# Patient Record
Sex: FEMALE | Race: WHITE | Employment: OTHER | URBAN - METROPOLITAN AREA
[De-identification: names, ages, dates, MRNs, and addresses within clinical notes are randomized per-mention and may not be internally consistent; named-entity substitution may affect disease eponyms.]

---

## 2021-06-25 ENCOUNTER — APPOINTMENT (OUTPATIENT)
Dept: RADIOLOGY | Facility: MEDICAL CENTER | Age: 81
End: 2021-06-25
Payer: COMMERCIAL

## 2021-06-25 ENCOUNTER — OFFICE VISIT (OUTPATIENT)
Dept: URGENT CARE | Facility: MEDICAL CENTER | Age: 81
End: 2021-06-25
Payer: COMMERCIAL

## 2021-06-25 VITALS
DIASTOLIC BLOOD PRESSURE: 75 MMHG | SYSTOLIC BLOOD PRESSURE: 197 MMHG | TEMPERATURE: 96.6 F | HEART RATE: 68 BPM | RESPIRATION RATE: 16 BRPM | OXYGEN SATURATION: 99 %

## 2021-06-25 DIAGNOSIS — S46.011A STRAIN OF RIGHT ROTATOR CUFF CAPSULE, INITIAL ENCOUNTER: Primary | ICD-10-CM

## 2021-06-25 DIAGNOSIS — S46.011A STRAIN OF RIGHT ROTATOR CUFF CAPSULE, INITIAL ENCOUNTER: ICD-10-CM

## 2021-06-25 PROCEDURE — S9083 URGENT CARE CENTER GLOBAL: HCPCS | Performed by: PHYSICIAN ASSISTANT

## 2021-06-25 PROCEDURE — 73030 X-RAY EXAM OF SHOULDER: CPT

## 2021-06-25 PROCEDURE — 99213 OFFICE O/P EST LOW 20 MIN: CPT | Performed by: PHYSICIAN ASSISTANT

## 2021-06-25 RX ORDER — LORAZEPAM 2 MG/1
4 TABLET ORAL
COMMUNITY
Start: 2021-06-07

## 2021-06-25 NOTE — PATIENT INSTRUCTIONS
Motrin and/or Tylenol as needed for pain relief   follow-up with physical therapy  Follow up with PCP in 3-5 days  Proceed to  ER if symptoms worsen  Rotator Cuff Injury   AMBULATORY CARE:   A rotator cuff injury  is damage to the muscles or tendons of your rotator cuff  The rotator cuff is a group of muscles and tendons that hold the shoulder joint in place  The damage may include muscle stretching, tendon tears, or bursa inflammation  The bursa is a fluid sac around the joint  Common signs and symptoms:   · Pain that may be constant or come and go (such as only when you lie on the injured shoulder)    · Pain or stiffness in your shoulder that travels down your arm    · Trouble lifting your arm or placing it behind your back    · Trouble moving or using your shoulder    · A swollen shoulder that may be painful to the touch    · Numbness in part or all of your arm    · A popping noise along with pain when you lift your arm    Call your doctor or orthopedist if:   · You suddenly cannot move your arm  · The pain in your shoulder or arm is not improving, or is worse than before you started treatment  · You have new pain in your neck  · You have questions or concerns about your condition or care  Treatment  may include any of the following:  · Medicines  may be needed for pain or inflammation  · A physical therapist  can teach you exercises to help improve shoulder movement and strength, and decrease pain  You may learn changes to daily activities that will help decrease stress on your tendons  · Surgery  may be needed if your injury is severe or your symptoms do not improve  Surgery may be used to clean away damaged tissue or fix a tear  A piece of another tissue or muscle may be used to fix a badly torn tendon  The bone of your shoulder joint may be reshaped so it stays in place  An artificial joint made of metal and plastic may be put into your shoulder      Care for your rotator cuff injury at home:   · Rest  may help your shoulder heal  Overuse of your shoulder can make your injury worse  Avoid heavy lifting, putting your arms over your head, or sports that need an overhead or throwing motion  Any of these movements can cause or worsen a rotator cuff injury  · Put ice on your shoulder  every few hours for the first several days  Ice helps decrease pain and swelling  Use an ice pack, or put crushed ice in a plastic bag  Wrap a towel around the bag before you put it on your shoulder  Apply ice for 15 minutes every hour, or as directed  · Put heat on your shoulder  when directed  After the first several days, heat may help relax the muscles in your shoulder  Use a heat pack or heating pad  Apply heat for 20 minutes every hour, or as directed  Follow up with your doctor or orthopedist as directed:  Write down your questions so you remember to ask them during your visits  © Copyright [address] Information is for End User's use only and may not be sold, redistributed or otherwise used for commercial purposes  All illustrations and images included in CareNotes® are the copyrighted property of [employer]  or [address] [place] [address]  The above information is an  only  It is not intended as medical advice for individual conditions or treatments  Talk to your doctor, nurse or pharmacist before following any medical regimen to see if it is safe and effective for you  Rotator Cuff Injury Exercises   AMBULATORY CARE:   What you need to know about rotator cuff injury exercises:  Exercises help improve shoulder movement and strength, and decrease pain  Your physical therapist or healthcare provider will tell you when to start doing the exercises  He or she will tell you how often to do them  You will need to start slowly to prevent more injury  You will move through several levels over time as you get stronger and more flexible       Safety guidelines:   · Always warm up before you do the exercises  Walk or ride a stationary bike for 5 to 10 minutes to help you warm up  · Do not put your arm over your head until directed  You will need to wait until your injury has healed  The movement of some exercises could continue until your arm is over your head  You will need to stop the movement where directed  · Stop if you feel pain  You may feel some tight or stiff areas when you start  This should get better as you continue the exercises  You should not feel pain  Pain means you are not ready to do the exercise yet  Stop and call your physical therapist or healthcare provider right away  · Always work both rotator cuffs  Even if your injury is only on 1 side, it is important to do each exercise on both sides  This helps prevent injury and maintain balance in your shoulders and back  · Posture is important  Your physical therapist or healthcare provider will show you the proper posture for each exercise  You will be shown how to pull your shoulders back and down to engage the correct muscles  Remember not to let your shoulders shrug during an exercise unless it is part of the movement  How to do stretching exercises: You will not feel every exercise in your shoulder area  You may feel some of the stretches in your back, side, or upper arm muscles  You need to work muscles in and around your rotator cuffs and down your arms  This helps stabilize your shoulders  Your physical therapist or healthcare provider will tell you how long to hold each stretch  He or she will also tell you how many times to repeat each stretch during an exercise session  You may be told to do only certain exercises, or to do them in a specific order  The following are general directions to help you remember the exercises you are taught:  · Pendulum swings:  Lean forward and rest your arm on a table  Do not round your back or lock your knees during the exercise   Let your other arm hang freely by your side  Gently swing your free arm forward and backward, side to side, and in circles  · Crossover arm stretch:  Relax your shoulders  Hold your upper arm with the opposite hand  Pull your arm across your chest until you feel a stretch  Hold the stretch  Return to the starting position  · Sleeper stretch:  Lie on your side on a firm, flat surface  Bend the elbow of your top arm 90°  Use your other hand to slowly push your arm down  Stop when you feel a stretch at the back of your shoulder  Hold the stretch  Slowly return to the starting position  · Shoulder movement, up and down: This exercise may also be called shoulder extension  Sit in a chair that has a back but no armrests  Raise your arm like you are reaching for the wall in front of you  Continue to raise the arm until it is over your head, or as high as directed  Bring your arm back down to your side  Bring it back as far as possible behind your body  Return your arm to the starting position  · Shoulder movement, side to side: These movements may be called flexion, internal rotation, and external rotation  Sit in a chair that has a back but no armrests  Raise your arm to the side and then up over your head as far as directed  Return your arm to your side  Bring your arm across the front of your body and reach for the opposite shoulder  Return your arm to the starting position  · Shoulder rolls:  Stand and raise both shoulders toward your ears  Lower them to the starting position  Relax your shoulders  Pull your shoulders back  Then relax them again  Roll your shoulders in a smooth [place]  Then roll your shoulders in a smooth [place] in the other direction  · Wall reach exercise: This may also be called a flexion stretch  Stand facing a wall  Slowly walk your fingers up the wall until you feel a stretch  Hold the stretch  Return to the starting position           · Arm reach exercise:  Lie on your back with your legs straight  Reach your arms like you are trying to touch the ceiling  Reach as high as you can so you feel a stretch in the back of your arms  Hold the stretch  Then lower your arms to your sides  · Elbow bends:  Stand with your arms down to your sides  Keep your palm facing forward  Bend your elbow and try to touch your shoulder with your fingertips  Return your arm to the starting position  · Up the back stretch:  Stand and put both arms behind your back  Put one hand under the other  Move the bottom hand and slowly push the upper hand up toward your head  You should feel a stretch in the front of your arm and shoulder  Be careful not to push too hard  Hold the stretch  Then return to the starting position  · Triceps stretch:  Stand and drop your forearm down your back so your hand is pointing to the ground behind you  Your elbow should be pointing at the ceiling  Take your other hand and place it on your elbow  Gently and slowly push on the elbow until you feel a stretch in the back of your arm  Hold the stretch  Let go of your elbow and relax your arm  You may be shown how to do this stretch with a towel  The towel can be pulled gently with a hand behind your back at waist level  How to do strengthening exercises:  Strengthening exercises may include handheld weights or resistance bands  Your physical therapist or healthcare provider will tell you how much weight or resistance to use  The general guide is to use light weights or low resistance and to do a high number of repetitions  You may be told to do only certain exercises, or to do them in a specific order  The following are general directions to help you remember the exercises you are taught:  · Scapular squeeze:  Stand with your arms at your sides  Squeeze your shoulder blades together and hold this position  Then relax the muscles  Keep your shoulder back during the entire exercise  · Wall pushups:   This exercise is similar to a pushup done on the ground  The goal is to use your back and shoulder muscles to bring your upper body toward and away from the wall  Stand facing a wall  Put your hands on the wall  Bend your elbows to bring your upper body toward the wall  Straighten your arms to return to the starting position  Keep your feet close enough to the wall that you do not take a step when you bend your elbows  · Standing row with exercise band:  Wrap the exercise band around a heavy, stable object at waist level  Make loop in the end of the band to create a handle, if needed  Hold the handle or loop and pull the band straight back toward your hip  Keep your shoulder down  Squeeze your shoulder blade  Hold this position  Then slowly return to the starting position  · External rotation with arm abducted 90 degrees:  Wrap the exercise band around a heavy, stable object at waist level  Make loop in the end of the band to create a handle, if needed  Stand and hold the handle or loop  Bend your elbow and raise your arm to shoulder height  Keep your arm in this position  Raise your hand like you are pointing at the ceiling  Slowly return to the starting position  You may also be shown how to do this exercise lying down and with a weight  · Shoulder abduction with weight:  Stand and hold a weight in your hand with your palm facing your body  Slowly raise your arm to the side with your thumb pointing up  Then raise your arm as far as you can without pain  Hold this position  Then return to the starting position  · Shoulder abduction with exercise band:  Wrap the exercise band around a heavy, stable object near your foot  Grab the band  Keep your arm straight  Slowly raise your arm to the side with your thumb pointing up  Then, slowly pull the band as far as you can without pain  Slowly return to the starting position           · Shoulder adduction with weight:  Lie on your back on a firm surface  Hold a weight in your hand at your shoulder  Slowly raise your arm toward the ceiling and straighten your elbow  Hold this position  Then slowly return to the starting position  · Shoulder adduction with exercise band:  Wrap the exercise band around a heavy, stable object  Stand and face away from where the band is anchored  Hold each end of the band in both hands with your elbows bent  Your elbows should not be behind your body  Keep your arms parallel to the floor and slowly straighten your elbows  Hold this position  Slowly return to the starting position  Call your doctor or physical therapist if:   · You have sharp or worsening pain during exercise or at rest     · You have questions or concerns about your rotator cuff injury exercises  © Copyright [address] Information is for End User's use only and may not be sold, redistributed or otherwise used for commercial purposes  All illustrations and images included in CareNotes® are the copyrighted property of [employer]  or [facility] Irais Lin   The above information is an  only  It is not intended as medical advice for individual conditions or treatments  Talk to your doctor, nurse or pharmacist before following any medical regimen to see if it is safe and effective for you

## 2021-06-25 NOTE — PROGRESS NOTES
[facility]        NAME: Elaina Oliva is a [address] [place] [address] y o  female  : 1940    MRN: [id number]  DATE: 2021  TIME: 10:39 AM    Assessment and Plan   Strain of right rotator cuff capsule, initial encounter [S46 011A]  1  Strain of right rotator cuff capsule, initial encounter  XR shoulder 2+ vw right    Ambulatory referral to Physical Therapy         Patient Instructions      Motrin and/or Tylenol as needed for pain relief   follow-up with physical therapy  Follow up with PCP in 3-5 days  Proceed to  ER if symptoms worsen  Chief Complaint     Chief Complaint   Patient presents with    Shoulder Pain     Patient relates she was gardening x1 week ago and picking up things  She started with right shoulder pain  Denies falling  Denies chest pain and SOB  History of Present Illness        80-year-old female presents with right shoulder pain  Patient reports 1 week ago she was gardening and injured her right shoulder  Patient reports injury was sustained when she was holding an item and she lifted up her shoulder and arm with the item in her hand and felt a sharp pain in her shoulder  Patient since then has been having intermittent pain in the right shoulder in certain movements and positions  Denies any numbness or tingling to the shoulder  No previous injuries to the shoulder  No surgeries to the shoulder  Denies any chest pain shortness of breath  No abdominal pain nausea vomiting diarrhea  Denies any fevers or chills  No numbness tingling in to the hand  Shoulder Injury   The incident occurred at home  The right shoulder is affected  The incident occurred 5 to 7 days ago  Injury mechanism: Lifting an item up with arm  The quality of the pain is described as aching and shooting  The pain does not radiate  The pain is mild  Pertinent negatives include no chest pain, muscle weakness, numbness or tingling  Nothing aggravates the symptoms  She has tried nothing for the symptoms  The treatment provided no relief  Review of Systems   Review of Systems   Constitutional: Negative  Respiratory: Negative  Cardiovascular: Negative  Negative for chest pain  Gastrointestinal: Negative  Musculoskeletal: Positive for arthralgias  Skin: Negative  Neurological: Negative  Negative for tingling and numbness  Current Medications       Current Outpatient Medications:     LORazepam (ATIVAN) 2 mg tablet, Take 4 mg by mouth daily at bedtime, Disp: , Rfl:     Current Allergies     Allergies as of 06/25/2021    (No Known Allergies)            The following portions of the patient's history were reviewed and updated as appropriate: allergies, current medications, past family history, past medical history, past social history, past surgical history and problem list      History reviewed  No pertinent past medical history  History reviewed  No pertinent surgical history  History reviewed  No pertinent family history  Medications have been verified  Objective   BP (!) 197/75   Pulse 68   Temp (!) 96 6 °F (35 9 °C) (Tympanic)   Resp 16   SpO2 99%   No LMP recorded  Patient is postmenopausal        Physical Exam     Physical Exam  Vitals and nursing note reviewed  Constitutional:       General: She is not in acute distress  Appearance: She is well-developed  HENT:      Head: Normocephalic and atraumatic  Right Ear: External ear normal       Left Ear: External ear normal       Nose: Nose normal       Mouth/Throat:      Pharynx: No oropharyngeal exudate  Eyes:      General:         Right eye: No discharge  Left eye: No discharge  Conjunctiva/sclera: Conjunctivae normal    Pulmonary:      Effort: Pulmonary effort is normal  No respiratory distress  Musculoskeletal:         General: Normal range of motion  Right shoulder: Tenderness present  No swelling, deformity, effusion, laceration, bony tenderness or crepitus   Normal range of motion  Normal strength  Normal pulse  Cervical back: Normal range of motion and neck supple  Comments:  Mild pain with palpation to supraspinatus area on rotator cuff  No deformity swelling erythema noted  Five and half strength with shoulder external rotation  Negative Centinela  Negative Neer's or Arley Peal  Pain is  Worsen with pushing resistance away from her body she feels pain in her posterior shoulder   Skin:     General: Skin is warm and dry  Neurological:      Mental Status: She is alert and oriented to person, place, and time  x-rays reviewed  No abnormalities noted

## 2025-06-24 ENCOUNTER — TELEPHONE (OUTPATIENT)
Age: 85
End: 2025-06-24

## 2025-06-24 NOTE — TELEPHONE ENCOUNTER
Received an email with hospital discharged papers from [facility] in [place]. Pt resides in [place]. Pt has been added to high priority wait list for MM. Papers scanned to epic.